# Patient Record
Sex: FEMALE | Race: WHITE
[De-identification: names, ages, dates, MRNs, and addresses within clinical notes are randomized per-mention and may not be internally consistent; named-entity substitution may affect disease eponyms.]

---

## 2019-06-16 ENCOUNTER — HOSPITAL ENCOUNTER (EMERGENCY)
Dept: HOSPITAL 94 - ER | Age: 49
Discharge: HOME | End: 2019-06-16
Payer: MEDICAID

## 2019-06-16 VITALS — HEIGHT: 66 IN | WEIGHT: 200.42 LBS | BODY MASS INDEX: 32.21 KG/M2

## 2019-06-16 VITALS — SYSTOLIC BLOOD PRESSURE: 136 MMHG | DIASTOLIC BLOOD PRESSURE: 85 MMHG

## 2019-06-16 DIAGNOSIS — H61.23: Primary | ICD-10-CM

## 2019-06-16 DIAGNOSIS — Z88.0: ICD-10-CM

## 2019-06-16 DIAGNOSIS — Z88.5: ICD-10-CM

## 2019-06-16 PROCEDURE — 99282 EMERGENCY DEPT VISIT SF MDM: CPT

## 2019-06-16 PROCEDURE — 69209 REMOVE IMPACTED EAR WAX UNI: CPT

## 2019-06-16 NOTE — NUR
bilateral ears have irrigated with warm water and hydrogen peroxide, pt kenyetta 
well, large piece of wax came out of left ear, small piece from rt ear, STEFANIE MCMANUS aware